# Patient Record
Sex: FEMALE | Race: BLACK OR AFRICAN AMERICAN | NOT HISPANIC OR LATINO | Employment: FULL TIME | URBAN - METROPOLITAN AREA
[De-identification: names, ages, dates, MRNs, and addresses within clinical notes are randomized per-mention and may not be internally consistent; named-entity substitution may affect disease eponyms.]

---

## 2022-02-07 ENCOUNTER — TELEPHONE (OUTPATIENT)
Dept: OBSTETRICS AND GYNECOLOGY | Facility: CLINIC | Age: 27
End: 2022-02-07
Payer: MEDICAID

## 2022-02-07 NOTE — TELEPHONE ENCOUNTER
Contacted pt to r/s appt . Pt left clinic this morning because she wasn't seen at her appt time. Advised pt that we were waiting for an available exam to room her. Offered pt to r/s on 2/14/22. Pt accepted then declined and states she will call back to r/s and hung up.